# Patient Record
Sex: FEMALE | Race: WHITE | NOT HISPANIC OR LATINO | Employment: FULL TIME | URBAN - METROPOLITAN AREA
[De-identification: names, ages, dates, MRNs, and addresses within clinical notes are randomized per-mention and may not be internally consistent; named-entity substitution may affect disease eponyms.]

---

## 2023-02-25 LAB
ALANINE AMINOTRANSFERASE (SGPT) (U/L) IN SER/PLAS: 7 U/L (ref 7–45)
ALBUMIN (G/DL) IN SER/PLAS: 4.2 G/DL (ref 3.4–5)
ALKALINE PHOSPHATASE (U/L) IN SER/PLAS: 41 U/L (ref 33–110)
ANION GAP IN SER/PLAS: 13 MMOL/L (ref 10–20)
ASPARTATE AMINOTRANSFERASE (SGOT) (U/L) IN SER/PLAS: 13 U/L (ref 9–39)
BASOPHILS (10*3/UL) IN BLOOD BY AUTOMATED COUNT: 0.06 X10E9/L (ref 0–0.1)
BASOPHILS/100 LEUKOCYTES IN BLOOD BY AUTOMATED COUNT: 1.1 % (ref 0–2)
BILIRUBIN TOTAL (MG/DL) IN SER/PLAS: 0.6 MG/DL (ref 0–1.2)
CALCIUM (MG/DL) IN SER/PLAS: 9.3 MG/DL (ref 8.6–10.3)
CARBON DIOXIDE, TOTAL (MMOL/L) IN SER/PLAS: 28 MMOL/L (ref 21–32)
CHLORIDE (MMOL/L) IN SER/PLAS: 103 MMOL/L (ref 98–107)
CHOLESTEROL (MG/DL) IN SER/PLAS: 179 MG/DL (ref 0–199)
CHOLESTEROL IN HDL (MG/DL) IN SER/PLAS: 63.9 MG/DL
CHOLESTEROL/HDL RATIO: 2.8
CREATININE (MG/DL) IN SER/PLAS: 0.77 MG/DL (ref 0.5–1.05)
EOSINOPHILS (10*3/UL) IN BLOOD BY AUTOMATED COUNT: 0.19 X10E9/L (ref 0–0.7)
EOSINOPHILS/100 LEUKOCYTES IN BLOOD BY AUTOMATED COUNT: 3.6 % (ref 0–6)
ERYTHROCYTE DISTRIBUTION WIDTH (RATIO) BY AUTOMATED COUNT: 12.5 % (ref 11.5–14.5)
ERYTHROCYTE MEAN CORPUSCULAR HEMOGLOBIN CONCENTRATION (G/DL) BY AUTOMATED: 32.1 G/DL (ref 32–36)
ERYTHROCYTE MEAN CORPUSCULAR VOLUME (FL) BY AUTOMATED COUNT: 101 FL (ref 80–100)
ERYTHROCYTES (10*6/UL) IN BLOOD BY AUTOMATED COUNT: 3.97 X10E12/L (ref 4–5.2)
GFR FEMALE: >90 ML/MIN/1.73M2
GLUCOSE (MG/DL) IN SER/PLAS: 86 MG/DL (ref 74–99)
HEMATOCRIT (%) IN BLOOD BY AUTOMATED COUNT: 40.2 % (ref 36–46)
HEMOGLOBIN (G/DL) IN BLOOD: 12.9 G/DL (ref 12–16)
IMMATURE GRANULOCYTES/100 LEUKOCYTES IN BLOOD BY AUTOMATED COUNT: 0.2 % (ref 0–0.9)
LDL: 104 MG/DL (ref 0–99)
LEUKOCYTES (10*3/UL) IN BLOOD BY AUTOMATED COUNT: 5.3 X10E9/L (ref 4.4–11.3)
LYMPHOCYTES (10*3/UL) IN BLOOD BY AUTOMATED COUNT: 1.33 X10E9/L (ref 1.2–4.8)
LYMPHOCYTES/100 LEUKOCYTES IN BLOOD BY AUTOMATED COUNT: 25.3 % (ref 13–44)
MONOCYTES (10*3/UL) IN BLOOD BY AUTOMATED COUNT: 0.44 X10E9/L (ref 0.1–1)
MONOCYTES/100 LEUKOCYTES IN BLOOD BY AUTOMATED COUNT: 8.4 % (ref 2–10)
NEUTROPHILS (10*3/UL) IN BLOOD BY AUTOMATED COUNT: 3.22 X10E9/L (ref 1.2–7.7)
NEUTROPHILS/100 LEUKOCYTES IN BLOOD BY AUTOMATED COUNT: 61.4 % (ref 40–80)
PLATELETS (10*3/UL) IN BLOOD AUTOMATED COUNT: 255 X10E9/L (ref 150–450)
POTASSIUM (MMOL/L) IN SER/PLAS: 4.3 MMOL/L (ref 3.5–5.3)
PROTEIN TOTAL: 6.9 G/DL (ref 6.4–8.2)
SODIUM (MMOL/L) IN SER/PLAS: 140 MMOL/L (ref 136–145)
TRIGLYCERIDE (MG/DL) IN SER/PLAS: 57 MG/DL (ref 0–149)
UREA NITROGEN (MG/DL) IN SER/PLAS: 15 MG/DL (ref 6–23)
VLDL: 11 MG/DL (ref 0–40)

## 2024-02-26 ENCOUNTER — OFFICE VISIT (OUTPATIENT)
Dept: PRIMARY CARE | Facility: CLINIC | Age: 44
End: 2024-02-26
Payer: COMMERCIAL

## 2024-02-26 ENCOUNTER — LAB (OUTPATIENT)
Dept: LAB | Facility: LAB | Age: 44
End: 2024-02-26
Payer: COMMERCIAL

## 2024-02-26 VITALS
DIASTOLIC BLOOD PRESSURE: 68 MMHG | BODY MASS INDEX: 22.07 KG/M2 | HEART RATE: 73 BPM | SYSTOLIC BLOOD PRESSURE: 106 MMHG | TEMPERATURE: 98.2 F | OXYGEN SATURATION: 97 % | WEIGHT: 149 LBS | HEIGHT: 69 IN

## 2024-02-26 DIAGNOSIS — Z00.00 HEALTHCARE MAINTENANCE: ICD-10-CM

## 2024-02-26 DIAGNOSIS — Z23 NEED FOR VACCINATION: ICD-10-CM

## 2024-02-26 DIAGNOSIS — Z00.00 HEALTHCARE MAINTENANCE: Primary | ICD-10-CM

## 2024-02-26 DIAGNOSIS — F41.9 ANXIETY: ICD-10-CM

## 2024-02-26 DIAGNOSIS — Z12.11 ENCOUNTER FOR SCREENING FOR MALIGNANT NEOPLASM OF COLON: ICD-10-CM

## 2024-02-26 PROBLEM — F32.A ANXIETY AND DEPRESSION: Status: ACTIVE | Noted: 2024-02-26

## 2024-02-26 PROBLEM — R10.9 STOMACH PAIN: Status: RESOLVED | Noted: 2024-02-26 | Resolved: 2024-02-26

## 2024-02-26 PROBLEM — R00.2 PALPITATIONS: Status: ACTIVE | Noted: 2024-02-26

## 2024-02-26 PROBLEM — S93.409A ANKLE SPRAIN: Status: RESOLVED | Noted: 2024-02-26 | Resolved: 2024-02-26

## 2024-02-26 LAB
ALBUMIN SERPL BCP-MCNC: 4.2 G/DL (ref 3.4–5)
ALP SERPL-CCNC: 36 U/L (ref 33–110)
ALT SERPL W P-5'-P-CCNC: 8 U/L (ref 7–45)
ANION GAP SERPL CALC-SCNC: 8 MMOL/L (ref 10–20)
AST SERPL W P-5'-P-CCNC: 14 U/L (ref 9–39)
BASOPHILS # BLD AUTO: 0.04 X10*3/UL (ref 0–0.1)
BASOPHILS NFR BLD AUTO: 0.7 %
BILIRUB SERPL-MCNC: 0.6 MG/DL (ref 0–1.2)
BUN SERPL-MCNC: 11 MG/DL (ref 6–23)
CALCIUM SERPL-MCNC: 8.9 MG/DL (ref 8.6–10.3)
CHLORIDE SERPL-SCNC: 105 MMOL/L (ref 98–107)
CHOLEST SERPL-MCNC: 168 MG/DL (ref 0–199)
CHOLESTEROL/HDL RATIO: 2.7
CO2 SERPL-SCNC: 30 MMOL/L (ref 21–32)
CREAT SERPL-MCNC: 0.71 MG/DL (ref 0.5–1.05)
EGFRCR SERPLBLD CKD-EPI 2021: >90 ML/MIN/1.73M*2
EOSINOPHIL # BLD AUTO: 0.16 X10*3/UL (ref 0–0.7)
EOSINOPHIL NFR BLD AUTO: 2.7 %
ERYTHROCYTE [DISTWIDTH] IN BLOOD BY AUTOMATED COUNT: 12.3 % (ref 11.5–14.5)
GLUCOSE SERPL-MCNC: 77 MG/DL (ref 74–99)
HCT VFR BLD AUTO: 38.4 % (ref 36–46)
HDLC SERPL-MCNC: 62.8 MG/DL
HGB BLD-MCNC: 12.4 G/DL (ref 12–16)
IMM GRANULOCYTES # BLD AUTO: 0.02 X10*3/UL (ref 0–0.7)
IMM GRANULOCYTES NFR BLD AUTO: 0.3 % (ref 0–0.9)
LDLC SERPL CALC-MCNC: 91 MG/DL
LYMPHOCYTES # BLD AUTO: 1.55 X10*3/UL (ref 1.2–4.8)
LYMPHOCYTES NFR BLD AUTO: 26.5 %
MCH RBC QN AUTO: 32.9 PG (ref 26–34)
MCHC RBC AUTO-ENTMCNC: 32.3 G/DL (ref 32–36)
MCV RBC AUTO: 102 FL (ref 80–100)
MONOCYTES # BLD AUTO: 0.47 X10*3/UL (ref 0.1–1)
MONOCYTES NFR BLD AUTO: 8 %
NEUTROPHILS # BLD AUTO: 3.6 X10*3/UL (ref 1.2–7.7)
NEUTROPHILS NFR BLD AUTO: 61.8 %
NON HDL CHOLESTEROL: 105 MG/DL (ref 0–149)
NRBC BLD-RTO: 0 /100 WBCS (ref 0–0)
PLATELET # BLD AUTO: 232 X10*3/UL (ref 150–450)
POTASSIUM SERPL-SCNC: 4.1 MMOL/L (ref 3.5–5.3)
PROT SERPL-MCNC: 6.4 G/DL (ref 6.4–8.2)
RBC # BLD AUTO: 3.77 X10*6/UL (ref 4–5.2)
SODIUM SERPL-SCNC: 139 MMOL/L (ref 136–145)
TRIGL SERPL-MCNC: 73 MG/DL (ref 0–149)
TSH SERPL-ACNC: 1.8 MIU/L (ref 0.44–3.98)
VLDL: 15 MG/DL (ref 0–40)
WBC # BLD AUTO: 5.8 X10*3/UL (ref 4.4–11.3)

## 2024-02-26 PROCEDURE — 99396 PREV VISIT EST AGE 40-64: CPT | Performed by: NURSE PRACTITIONER

## 2024-02-26 PROCEDURE — 90715 TDAP VACCINE 7 YRS/> IM: CPT | Performed by: NURSE PRACTITIONER

## 2024-02-26 PROCEDURE — 1036F TOBACCO NON-USER: CPT | Performed by: NURSE PRACTITIONER

## 2024-02-26 PROCEDURE — 84443 ASSAY THYROID STIM HORMONE: CPT

## 2024-02-26 PROCEDURE — 85025 COMPLETE CBC W/AUTO DIFF WBC: CPT

## 2024-02-26 PROCEDURE — 80061 LIPID PANEL: CPT

## 2024-02-26 PROCEDURE — 80053 COMPREHEN METABOLIC PANEL: CPT

## 2024-02-26 PROCEDURE — 90471 IMMUNIZATION ADMIN: CPT | Performed by: NURSE PRACTITIONER

## 2024-02-26 PROCEDURE — 36415 COLL VENOUS BLD VENIPUNCTURE: CPT

## 2024-02-26 RX ORDER — VIT C/E/ZN/COPPR/LUTEIN/ZEAXAN 250MG-90MG
CAPSULE ORAL
COMMUNITY
Start: 2015-11-25

## 2024-02-26 RX ORDER — MULTIVITAMIN
1 TABLET ORAL DAILY
COMMUNITY
Start: 2017-02-16

## 2024-02-26 RX ORDER — PROPRANOLOL/HYDROCHLOROTHIAZID 40 MG-25MG
1 TABLET ORAL DAILY
COMMUNITY

## 2024-02-26 RX ORDER — ESCITALOPRAM OXALATE 20 MG/1
20 TABLET ORAL DAILY
COMMUNITY
End: 2024-02-26 | Stop reason: SDUPTHER

## 2024-02-26 RX ORDER — ESCITALOPRAM OXALATE 20 MG/1
20 TABLET ORAL DAILY
Qty: 90 TABLET | Refills: 3 | Status: SHIPPED | OUTPATIENT
Start: 2024-02-26

## 2024-02-26 ASSESSMENT — ENCOUNTER SYMPTOMS
GASTROINTESTINAL NEGATIVE: 1
MUSCULOSKELETAL NEGATIVE: 1
CONSTITUTIONAL NEGATIVE: 1
PSYCHIATRIC NEGATIVE: 1
NEUROLOGICAL NEGATIVE: 1
RESPIRATORY NEGATIVE: 1
CARDIOVASCULAR NEGATIVE: 1

## 2024-02-26 ASSESSMENT — PATIENT HEALTH QUESTIONNAIRE - PHQ9
1. LITTLE INTEREST OR PLEASURE IN DOING THINGS: NOT AT ALL
2. FEELING DOWN, DEPRESSED OR HOPELESS: NOT AT ALL
SUM OF ALL RESPONSES TO PHQ9 QUESTIONS 1 AND 2: 0

## 2024-02-26 NOTE — PATIENT INSTRUCTIONS
I will follow up with the blood work.   Please have colonoscopy done. Tdap updated today.  Medications renewed.   Follow up in 1 year and as needed

## 2024-02-26 NOTE — PROGRESS NOTES
"Subjective   Patient ID: Paty Tom is a 43 y.o. female who presents for Annual Exam (Patient has GYN-Reema Osoriol).    HPI Presents today for health maintenance.  Overall is excellent.   Dentist every 6 months for cleaning.  Brush and floss  Eye exam yearly.   Wears glasses  Skin skins yearly.   No hearing loss..   Non smoker, alcohol 0-1 per week. No drug use.   Diet is well balanced.   Exercise,  El[place]pit[place] 4 times a week along with weight training 4 days a week.   30-40 minutes each time  Immunizations as noted. . Last tdap 9 years ago.   HPV and pap negative 2/22/2023  Mammogram 9/2023 normal.   No colon screening yet  Tolerating medications well, anxiety is under good control    Review of Systems   Constitutional: Negative.    Respiratory: Negative.     Cardiovascular: Negative.    Gastrointestinal: Negative.    Musculoskeletal: Negative.         Has had some elbow pain.  No redness or swelling.  Has been seeing a chiropractor   Skin: Negative.    Neurological: Negative.    Psychiatric/Behavioral: Negative.         Objective   /68 (BP Location: Left arm, Patient Position: Sitting)   Pulse 73   Temp 36.8 °C (98.2 °F) (Temporal)   Ht 1.74 m (5' 8.5\")   Wt 67.6 kg (149 lb)   SpO2 97%   BMI 22.33 kg/m²     Physical Exam  Constitutional:       Appearance: Normal appearance.   HENT:      Right Ear: Tympanic membrane, ear canal and external ear normal.      Left Ear: Tympanic membrane, ear canal and external ear normal.      Nose: Nose normal.      Mouth/Throat:      Mouth: Mucous membranes are moist.      Pharynx: Oropharynx is clear.   Eyes:      Pupils: Pupils are equal, round, and reactive to light.   Cardiovascular:      Rate and Rhythm: Normal rate and regular rhythm.      Pulses: Normal pulses.      Heart sounds: Normal heart sounds.   Pulmonary:      Effort: Pulmonary effort is normal.      Breath sounds: Normal breath sounds.   Abdominal:      General: Abdomen is flat. Bowel sounds are " normal.      Palpations: Abdomen is soft.   Musculoskeletal:         General: Normal range of motion.      Cervical back: Normal range of motion.   Lymphadenopathy:      Cervical: No cervical adenopathy.   Skin:     General: Skin is warm.   Neurological:      General: No focal deficit present.      Mental Status: She is alert and oriented to person, place, and time.   Psychiatric:         Mood and Affect: Mood normal.         Behavior: Behavior normal.         Assessment/Plan   Problem List Items Addressed This Visit    None  Visit Diagnoses         Codes    Healthcare maintenance    -  Primary Z00.00    Relevant Orders    CBC and Auto Differential    Comprehensive Metabolic Panel    Lipid Panel    Thyroid Stimulating Hormone    Anxiety     F41.9    Relevant Medications    escitalopram (Lexapro) 20 mg tablet    Encounter for screening for malignant neoplasm of colon     Z12.11    Relevant Orders    Colonoscopy Screening; Average Risk Patient    Need for vaccination     Z23    Relevant Orders    Tdap vaccine, age 7 years and older  (BOOSTRIX)

## 2024-08-07 PROBLEM — J02.0 PHARYNGITIS DUE TO STREPTOCOCCUS SPECIES: Status: RESOLVED | Noted: 2024-08-07 | Resolved: 2024-08-07

## 2024-08-08 ENCOUNTER — APPOINTMENT (OUTPATIENT)
Dept: PRIMARY CARE | Facility: CLINIC | Age: 44
End: 2024-08-08
Payer: COMMERCIAL

## 2024-08-08 ENCOUNTER — HOSPITAL ENCOUNTER (OUTPATIENT)
Dept: RADIOLOGY | Facility: CLINIC | Age: 44
Discharge: HOME | End: 2024-08-08
Payer: COMMERCIAL

## 2024-08-08 VITALS
BODY MASS INDEX: 22.18 KG/M2 | HEART RATE: 69 BPM | SYSTOLIC BLOOD PRESSURE: 112 MMHG | TEMPERATURE: 97.6 F | OXYGEN SATURATION: 97 % | DIASTOLIC BLOOD PRESSURE: 72 MMHG | WEIGHT: 148 LBS

## 2024-08-08 DIAGNOSIS — M25.552 LEFT HIP PAIN: ICD-10-CM

## 2024-08-08 DIAGNOSIS — M25.552 LEFT HIP PAIN: Primary | ICD-10-CM

## 2024-08-08 PROCEDURE — 73502 X-RAY EXAM HIP UNI 2-3 VIEWS: CPT | Mod: LT

## 2024-08-08 PROCEDURE — 99213 OFFICE O/P EST LOW 20 MIN: CPT | Performed by: NURSE PRACTITIONER

## 2024-08-08 PROCEDURE — 1036F TOBACCO NON-USER: CPT | Performed by: NURSE PRACTITIONER

## 2024-08-08 ASSESSMENT — ENCOUNTER SYMPTOMS: CONSTITUTIONAL NEGATIVE: 1

## 2024-08-08 NOTE — PATIENT INSTRUCTIONS
I will follow up with the xray.   Please do  the PT and if no better in 3-4 let me know and I will send you for evaluation with an orthopedist.

## 2024-08-08 NOTE — PROGRESS NOTES
Subjective   Patient ID: Paty Tom is a 43 y.o. female who presents for Hip Pain (Left sided-now has pain while walking).    HPI Presents today with Left hip pain.  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   Now painful with walking  Feels like it shifts.  It has not limited her motion.    Review of Systems   Constitutional: Negative.    Musculoskeletal:         As noted in HPI         Objective   /72 (BP Location: Right arm, Patient Position: Sitting)   Pulse 69   Temp 36.4 °C (97.6 °F) (Temporal)   Wt 67.1 kg (148 lb)   SpO2 97%   BMI 22.18 kg/m²     Physical Exam  Constitutional:       Appearance: Normal appearance.   Pulmonary:      Effort: Pulmonary effort is normal.   Musculoskeletal:         General: Normal range of motion.      Comments: Pain with straight leg lift in her left groin and also with flexed knee and rotation up and inward   Skin:     General: Skin is warm.   Neurological:      General: No focal deficit present.      Mental Status: She is alert.   Psychiatric:         Mood and Affect: Mood normal.         Behavior: Behavior normal.         Assessment/Plan   Problem List Items Addressed This Visit    None  Visit Diagnoses         Codes    Left hip pain    -  Primary M25.552    Relevant Orders    XR hip left with pelvis when performed 2 or 3 views    Referral to Physical Therapy

## 2024-08-28 ENCOUNTER — APPOINTMENT (OUTPATIENT)
Dept: GASTROENTEROLOGY | Facility: EXTERNAL LOCATION | Age: 44
End: 2024-08-28
Payer: COMMERCIAL

## 2024-08-28 DIAGNOSIS — Z12.11 ENCOUNTER FOR SCREENING FOR MALIGNANT NEOPLASM OF COLON: ICD-10-CM

## 2024-08-28 PROCEDURE — 45378 DIAGNOSTIC COLONOSCOPY: CPT | Performed by: INTERNAL MEDICINE

## 2024-09-03 ENCOUNTER — EVALUATION (OUTPATIENT)
Dept: PHYSICAL THERAPY | Facility: CLINIC | Age: 44
End: 2024-09-03
Payer: COMMERCIAL

## 2024-09-03 DIAGNOSIS — M25.552 LEFT HIP PAIN: ICD-10-CM

## 2024-09-03 PROCEDURE — 97110 THERAPEUTIC EXERCISES: CPT | Mod: GP | Performed by: PHYSICAL THERAPIST

## 2024-09-03 PROCEDURE — 97161 PT EVAL LOW COMPLEX 20 MIN: CPT | Mod: GP | Performed by: PHYSICAL THERAPIST

## 2024-09-03 ASSESSMENT — PATIENT HEALTH QUESTIONNAIRE - PHQ9
2. FEELING DOWN, DEPRESSED OR HOPELESS: NOT AT ALL
SUM OF ALL RESPONSES TO PHQ9 QUESTIONS 1 AND 2: 0
1. LITTLE INTEREST OR PLEASURE IN DOING THINGS: NOT AT ALL

## 2024-09-03 ASSESSMENT — ENCOUNTER SYMPTOMS
OCCASIONAL FEELINGS OF UNSTEADINESS: 0
DEPRESSION: 0
LOSS OF SENSATION IN FEET: 0

## 2024-09-03 NOTE — PROGRESS NOTES
Physical Therapy Evaluation    Patient Name: Paty Tom  MRN: [id number]  Today's Date: 9/3/2024  Visit: 1/mn  DOS/DOI:  1/1/2024 (date symptoms worsened)  Referred by:   Tiffani Smith    Time Calculation  Start Time: 1402  Stop Time: 1450  Time Calculation (min): 48 min  PT Evaluation Time Entry  PT Evaluation (Low) Time Entry: 30  PT Therapeutic Procedures Time Entry  Therapeutic Exercise Time Entry: 18                 Diagnosis:   1. 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  The L hip has always shifted and clicked with intermittent pain for 20 years.  When she stopped dancing the pain went away with ADLS but would still hurt with stretching..  The pain has been worsening since January 2024 without known cause or injury.  She went to her MD and a x-ray was done which was positive for mild OA.  She was referred to PT.  She may see ortho.    Precautions  None    SUBJECTIVE  Symptoms:  - She reports anterior L hip pain/groin that is described as deep and achy.  It can become sharp if the joint shifts.  The pain is rated 2-5/10.  The pain is worsened by ER of the hip, sitting, hip add and moving through extreme ROM.  Nothing helps decrease the pain at this point.  Functionally she is limited in hiking, jumping or running.      Patient's Living Environment:  - 2 story home with bedroom on 2nd floor  - lives with  and 4 children  - works as     Previous Level of Function:  - pain with running, hiking and jumping before January 2024 but not as bad or as limiting.     Patient's Therapy Goals:  - resolve pain  - improve joint stability    OBJECTIVE  Observation:    - good LE alignment    Palpation:   - no pain with palpation (it is  deeper into the joint)  - ASIS level in supine    AROM:  - L hip flex = 130 with ant L hip pain, abd = 30 with ant L hip pain, add = WFL, ER/IR = 40    MMT:  - L hip flex, ext, IR = 5/5, ER = 4+/5, add = 5/5, abd = 4+/5  - L glut min = 5/5, med = 4+/5, max = 4+/5  - TA = strong    Special Tests:  - Positive squat (weight shifts to R LE due to L hip pain and shifting), scour, FADIR, ANITA  - Negative PA L1-5    Joint Mobility:   - generally hypermobile due to dance    Outcome Measure:  - LEFS = 70    ASSESSMENT  The patient is a 45 y/o female presenting with chronic, deep, anterior L hip pain accompanied by shifting and clicking that worsened in January 2024 without known cause.  I suspect she has a labral tear.  Symptoms are worse with any ER position.  Positive scour, FABDIR, ANITA and squat test.  She has decreased AROM into ER./IR and is functionally limited in more athletic movements such as running, jumping or hiking.  She will benefit from PT to stabilize the L hip.  We will continue for approximately 6 weeks.  If no change seen she will be referred to ortho and a MRI requested.    Is skilled care required:  yes  Rehab potential:  good  Clinical presentation:  Stable and/or uncomplicated characteristics       Complexity:   . Low complexity due to patient's clinical presentation being stable and uncomplicated by any significant comorbidities that may affect rehab tolerance and progression.       Personal factors effecting care:  none    PLAN  Frequency/duration:  2x/wk x 6 wks  Planned Interventions:  MT, therapeutic exercise, neuro re-ed, modalities prn  Patient/caregiver agrees with POC:  yes    TODAY'S TREATMENT  - evaluation of the L hip completed.  She was educated on her dx and the anatomy of the hip.  She was given the names of Dr. Walker and Dr. Gastelum as potential ortho Mds for her to see.  - she was given a HEP as seen below:    Access Code: [email or web address]  URL:  [email or web address]  Date: 09/03/2024  Prepared by: Malorie Arevalo    Exercises  - Standing Repeated Hip Abduction with Resistance  - 1 x daily - 7 x weekly - 2-3 sets - 10 reps  - Standing Repeated Hip Extension with Resistance  - 1 x daily - 7 x weekly - 2-3 sets - 10 reps  - Standing Repeated Hip Flexion with Resistance  - 1 x daily - 7 x weekly - 2-3 sets - 10 reps  - Standing Repeated Hip Adduction with Resistance  - 1 x daily - 7 x weekly - 2-3 sets - 10 reps  - Forward T  - 1 x daily - 7 x weekly - 2-3 sets - 10 reps    Goals:   Active       PT Problem       she will be independent with he HEP       Start:  09/03/24    Expected End:  09/10/24            increase AROM ER/IR of L hip for 5-10 degrees for positions requiring rot of the hip such as crossing her ankle over there R leg to put shoes on.       Start:  09/03/24    Expected End:  09/17/24            increase stability of the L hip by achieving 5/5 strength of the glut med and max       Start:  09/03/24    Expected End:  10/15/24            return to activities such as running and hiking without symptoms increasing.       Start:  09/03/24    Expected End:  10/15/24            demonstrate labral healing by having a negative FADIR, ANITA and scour test       Start:  09/03/24    Expected End:  10/15/24

## 2024-09-09 ENCOUNTER — TREATMENT (OUTPATIENT)
Dept: PHYSICAL THERAPY | Facility: CLINIC | Age: 44
End: 2024-09-09
Payer: COMMERCIAL

## 2024-09-09 DIAGNOSIS — M25.552 LEFT HIP PAIN: ICD-10-CM

## 2024-09-09 PROCEDURE — 97530 THERAPEUTIC ACTIVITIES: CPT | Mod: GP

## 2024-09-09 PROCEDURE — 97110 THERAPEUTIC EXERCISES: CPT | Mod: GP

## 2024-09-09 NOTE — PROGRESS NOTES
Physical Therapy Treatment Note     Patient Name: Paty Tom  MRN: [id number]  Today's Date: 9/9/2024     Time Calculation  Start Time: 0915  Stop Time: 1000  Time Calculation (min): 45 min  PT Therapeutic Procedures Time Entry  Therapeutic Exercise Time Entry: 30  Therapeutic Activity Time Entry: 15    Visit: 2/MN  Referred by:  DANNI Mojica-CNP     Insurance:  [employer]   Certification Dates:    Current Problem:   1. 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  The Lt hip has always shifted & clicked with intermittent pain for 20 years.  When she stopped dancing the pain went away with ADL's but would still hurt with stretching..  The pain has been worsening since January 2024 without known cause or injury.  She saw her PCP, + x-ray for mild OA.  She was referred to PT.  She may see ortho.    Subjective:  Pt stated she only had pain with standing hip flexion with her HEP.  She is not limited with activities, but there's some discomfort.      Patient's Living Environment/PLOF: 2 story home with bedroom on 2nd floor,  with 4 children,   works as , choreographs dance in [place]  Patient's Therapy Goals: Decrease Lt hip pain, be more active, resolve joint instability     Pain:  Intensity: 2-5/10, deep, achy, can be sharp            Location: Lt groin            Duration: intermittent            Aggravating Factor: prolonged WB, Hip ER, Flex, sitting, impact activities            Alleviating Factor:  unloading Hip    Social Factors:  1-2 personal factors &/or comorbidities       Precautions:  Rt ankle sprain 3-4 x over the last few years     Objective      HEP Compliance:  100%    Treatments:    Exercises:  - Standing Repeated Hip Abd,  purple, 2 x 10, on black disc  - Standing Repeated Hip Ext,  purple, 2 x 10, on black disc  -  Standing Repeated Hip Flex,  purple, 2 x 10, on black disc  - Standing Repeated Hip Add,  purple, 2 x 10, on black disc  - Forward T, 10 x each, (B)    Bridges, 2 x 15, purple, 2 x 10    Plyothrow, on BOSU, 10 x each forward    Clamshells, purple, 2 x 10 (B)    Bird Dogs, 15 x each, 2.5 lbs    Fire Hydrants, 2 x 10       Assessment:     Pt has pain with any activity in flexed Lt hip.  Forward T's tolerated w/o increased pain.  Added exercise as noted above for LE strengthening.  Discussed pathology & limiting aggravating factors.  She verbalized an understanding.  She did not have pain with clams under resistance, but she did have some clicking in the Lt hip with fire hydrants w/o resistance.      Plan: Continue per POC & pt tolerance.  Frequency/duration: 2x/wk for 6 wks

## 2024-09-10 ENCOUNTER — APPOINTMENT (OUTPATIENT)
Dept: PHYSICAL THERAPY | Facility: CLINIC | Age: 44
End: 2024-09-10
Payer: COMMERCIAL

## 2024-09-11 ENCOUNTER — TREATMENT (OUTPATIENT)
Dept: PHYSICAL THERAPY | Facility: CLINIC | Age: 44
End: 2024-09-11
Payer: COMMERCIAL

## 2024-09-11 DIAGNOSIS — M25.552 LEFT HIP PAIN: ICD-10-CM

## 2024-09-11 PROCEDURE — 97110 THERAPEUTIC EXERCISES: CPT | Mod: GP

## 2024-09-11 PROCEDURE — 97530 THERAPEUTIC ACTIVITIES: CPT | Mod: GP

## 2024-09-11 NOTE — PROGRESS NOTES
Physical Therapy Treatment Note     Patient Name: Paty Tom  MRN: [id number]  Today's Date: 9/11/2024     Time Calculation  Start Time: 1045  Stop Time: 1135  Time Calculation (min): 50 min  PT Therapeutic Procedures Time Entry  Therapeutic Exercise Time Entry: 20  Therapeutic Activity Time Entry: 35    Visit: 3/MN  Referred by:  DANNI Mojica-CNP     Insurance:  [employer], 80% coverage, visits based on MN, no auth, ded $5000.00 (not met) per Laura at [facility] call ref# [id number]  Certification Dates:    Current Problem:   1. 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  The Lt hip has always shifted & clicked with intermittent pain for 20 years.  When she stopped dancing the pain went away with ADL's but would still hurt with stretching..  The pain has been worsening since January 2024 without known cause or injury.  She saw her PCP, + x-ray for mild OA.  She was referred to PT.  She may see ortho.    Subjective:  Pt stated no issues from the last visit.  The left hip feels good today.  She has not had time to schedule ortho consult yet.        Patient's Living Environment/PLOF: 2 story home with bedroom on 2nd floor,  with 4 children,   works as , choreographs dance in [place]  Patient's Therapy Goals: Decrease Lt hip pain, be more active, resolve joint instability     Pain:  Intensity: 2/10, deep, achy, can be sharp            Location: Lt groin            Duration: intermittent            Aggravating Factor: prolonged WB, Hip ER, Flex, sitting, impact activities            Alleviating Factor:  unloading Hip    Social Factors:  1-2 personal factors &/or comorbidities     Precautions:  Rt ankle sprain 3-4 x over the last few years     Objective      HEP Compliance:  100%    Treatments:    Exercises:  - Standing Repeated Hip Abd,   purple, 2 x 10, on black disc  - Standing Repeated Hip Ext,  purple, 2 x 10, on black disc  - Standing Repeated Hip Flex,  purple, 2 x 10, on black disc  - Standing Repeated Hip Add,  purple, 2 x 10, on black disc  - Bridges, 2 x 15, purple, 2 x 10    Therapeutic Activity    Hip Ext, 2 x 10, 40 lbs    Hip Add, 2 x 10, 40 lbs    Cable Walks, 55 lbs, 5 x, 45 lbs, 5 x each    Planks, increased pain Rt Hip    Bird Dogs, 15 x each, 2.5 lbs LE's. 1 lb, UE's    Fire Hydrants, 2 x 10     Forward T, 2 x 10 x each, (B), 5 lbs    Plyothrow, on BOSU, 10 x each forward    Clamshells, purple, 2 x 10 (B)    Assessment:     Pt has pain with any activity in hip flexion, ER, add.  Added exercise as noted above for LE strengthening & stability.  +Clicking in the Lt hip with fire hydrants w/o resistance today, but non painful.  Good tolerance to all other activities.    Plan: Continue per POC & pt tolerance.  Frequency/duration: 2x/wk for 6 wks

## 2024-09-16 DIAGNOSIS — M25.552 LEFT HIP PAIN: Primary | ICD-10-CM

## 2024-09-17 ENCOUNTER — TREATMENT (OUTPATIENT)
Dept: PHYSICAL THERAPY | Facility: CLINIC | Age: 44
End: 2024-09-17
Payer: COMMERCIAL

## 2024-09-17 DIAGNOSIS — M25.552 LEFT HIP PAIN: Primary | ICD-10-CM

## 2024-09-17 PROCEDURE — 97110 THERAPEUTIC EXERCISES: CPT | Mod: GP

## 2024-09-17 PROCEDURE — 97124 MASSAGE THERAPY: CPT | Mod: GP

## 2024-09-17 PROCEDURE — 97140 MANUAL THERAPY 1/> REGIONS: CPT | Mod: GP

## 2024-09-17 NOTE — PROGRESS NOTES
Physical Therapy Treatment Note     Patient Name: Paty Tom  MRN: [id number]  Today's Date: 9/17/2024     Time Calculation  Start Time: 1130  Stop Time: 1230  Time Calculation (min): 60 min  PT Therapeutic Procedures Time Entry  Manual Therapy Time Entry: 10  Therapeutic Exercise Time Entry: 14  Therapeutic Massage Time Entry: 36    Visit: 4/MN  Referred by:  Tiffani Smith, APRN-CNP     Insurance:  [employer], 80% coverage, visits based on MN, no auth, ded $5000.00 (not met) per Laura at [facility] call ref# [id number]    Current Problem:   1. 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  The Lt hip has always shifted & clicked with intermittent pain for 20 years.  When she stopped dancing the pain went away with ADL's but would still hurt with stretching..  The pain has been worsening since January 2024 without known cause or injury.  She saw her PCP, + x-ray for mild OA.  She was referred to PT.  She may see ortho.    Subjective:  Pt stated no issues from the last visit.  The left hip feels good today.  She has received 2 people for referrals for her Hip.  She will schedule later today.          Patient's Living Environment/PLOF: 2 story home with bedroom on 2nd floor,  with 4 children,   works as , choreographs dance in [place]    Patient's Therapy Goals: Decrease Lt hip pain, be more active, resolve joint instability     Pain:  Intensity: 2/10, deep, achy, can be sharp            Location: Lt groin            Duration: intermittent            Aggravating Factor: prolonged WB, Hip ER, Flex, sitting, impact activities            Alleviating Factor:  unloading Hip    Social Factors:  1-2 personal factors &/or comorbidities     Precautions:  Rt ankle sprain 3-4 x over the last few years     Objective      HEP Compliance:   100%    Treatments:    Exercises:  - Standing Repeated Hip Abd,  purple, 2 x 10, on black disc  - Standing Repeated Hip Ext,  purple, 2 x 10, on black disc  - Standing Repeated Hip Flex,  purple, 2 x 10, on black disc  - Standing Repeated Hip Add,  purple, 2 x 10, on black disc  - Bridges, 2 x 15, purple, 2 x 10    Therapeutic Activity    Hip Ext, 2 x 10, 40 lbs    Hip Add, 2 x 10, 40 lbs    Hip Abd, 2 x 10, 30 lbs    Cable Walks, 65 lbs, 5 x, 55 lbs, 5 x each    Planks, increased pain Rt Hip    Bird Dogs, 15 x each, 2.5 lbs LE's. 1 lb, UE's    Fire Hydrants, 2 x 10     Forward T, 2 x 10 x each, (B), 5 lbs    Plyothrow, on BOSU, 10 x each forward    Clamshells, purple, 2 x 10 (B)    P-Ball, SL Bridges, 2 x 10    Manual:  long axis distraction, lateral glides to Lt hip    Assessment:     Pt has pain with any activity into hip flexion, ER, add.  Added exercise as noted above for LE strengthening & stability.  + Clicking in the Lt hip with activities.  She was instructed to work in partial ranges, that donot provoke clicking or pain.  Good tolerance to all other activities.    Plan: Continue per POC & pt tolerance.  Frequency/duration: 2x/wk for 6 wks

## 2024-09-20 ENCOUNTER — TREATMENT (OUTPATIENT)
Dept: PHYSICAL THERAPY | Facility: CLINIC | Age: 44
End: 2024-09-20
Payer: COMMERCIAL

## 2024-09-20 DIAGNOSIS — M25.552 LEFT HIP PAIN: ICD-10-CM

## 2024-09-20 PROCEDURE — 97530 THERAPEUTIC ACTIVITIES: CPT | Mod: GP

## 2024-09-20 PROCEDURE — 97140 MANUAL THERAPY 1/> REGIONS: CPT | Mod: GP

## 2024-09-20 PROCEDURE — 97110 THERAPEUTIC EXERCISES: CPT | Mod: GP

## 2024-09-20 NOTE — PROGRESS NOTES
Physical Therapy Treatment Note     Patient Name: Paty Tom  MRN: [id number]  Today's Date: 9/20/2024     Time Calculation  Start Time: 0845  Stop Time: 0945  Time Calculation (min): 60 min  PT Therapeutic Procedures Time Entry  Manual Therapy Time Entry: 15  Therapeutic Exercise Time Entry: 15  Therapeutic Activity Time Entry: 30    Visit: Yaya/MN  Referred by:  Tiffani Smith, APRN-CNP     Insurance:  [employer], 80% coverage, visits based on MN, no auth, ded $5000.00 (not met) per Laura at [facility] call ref# [id number]    Current Problem:   1. 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  The Lt hip has always shifted & clicked with intermittent pain for 20 years.  When she stopped dancing the pain went away with ADL's but would still hurt with stretching..  The pain has been worsening since January 2024 without known cause or injury.  She saw her PCP, + x-ray for mild OA.  She was referred to PT.  She may see ortho.    Subjective:  Pt stated no issues from the last visit.  The left hip feels good today.  She has received 2 people for referrals for her Hip.  She will schedule later today.          Patient's Living Environment/PLOF: 2 story home with bedroom on 2nd floor,  with 4 children,   works as , choreographs dance in [place]    Patient's Therapy Goals: Decrease Lt hip pain, be more active, resolve joint instability     Pain:  Intensity: 2/10, deep, achy, can be sharp            Location: Lt groin            Duration: intermittent            Aggravating Factor: prolonged WB, Hip ER, Flex, sitting, impact activities            Alleviating Factor:  unloading Hip    Social Factors:  1-2 personal factors &/or comorbidities     Precautions:  Rt ankle sprain 3-4 x over the last few years     Objective      HEP Compliance:   100%    Treatments:    Exercises:  - Standing Repeated Hip Abd,  purple, 2 x 10, on black disc  - Standing Repeated Hip Ext,  purple, 2 x 10, on black disc  - Standing Repeated Hip Flex,  purple, 2 x 10, on black disc  - Standing Repeated Hip Add,  purple, 2 x 10, on black disc  - Bridges, 2 x 15, purple, 2 x 10    Therapeutic Activity    Hip Ext, 2 x 10, 40 lbs    Hip Add, 2 x 10, 40 lbs    Hip Abd, 2 x 10, 20 lbs    Cable Walks, 70 lbs, 5 x, 5 x each    Planks, increased pain Rt Hip    Bird Dogs, 15 x each, 2.5 lbs LE's. 1 lb, UE's    Fire Hydrants, 2 x 10     Forward T, 2 x 10 x each, (B), 5 lbs    Plyothrow, on BOSU, 10 x each forward    Clamshells, purple, 2 x 10 (B)    P-Ball, SL Bridges, 2 x 10    Manual:  long axis distraction, lateral glides to Lt hip    Assessment:     Pt has an ortho consult with Dr. Walker on 9-25-24, next Wed.  She denies any lingering pain with activities in the clinic.  + Clicking in the Lt hip with activities, decreased with position and load.  Less clicking noted today. Good tolerance to all other activities.    Plan: Continue per POC & pt tolerance.  Frequency/duration: 2x/wk for 6 wks

## 2024-09-23 ENCOUNTER — TREATMENT (OUTPATIENT)
Dept: PHYSICAL THERAPY | Facility: CLINIC | Age: 44
End: 2024-09-23
Payer: COMMERCIAL

## 2024-09-23 DIAGNOSIS — M25.552 LEFT HIP PAIN: Primary | ICD-10-CM

## 2024-09-23 PROCEDURE — 97140 MANUAL THERAPY 1/> REGIONS: CPT | Mod: GP

## 2024-09-23 PROCEDURE — 97110 THERAPEUTIC EXERCISES: CPT | Mod: GP

## 2024-09-23 PROCEDURE — 97530 THERAPEUTIC ACTIVITIES: CPT | Mod: GP

## 2024-09-23 NOTE — PROGRESS NOTES
Physical Therapy Treatment Note     Patient Name: Paty Tom  MRN: [id number]  Today's Date: 9/23/2024     Time Calculation  Start Time: 1000  Stop Time: 1100  Time Calculation (min): 60 min  PT Therapeutic Procedures Time Entry  Manual Therapy Time Entry: 10  Therapeutic Exercise Time Entry: 15  Therapeutic Activity Time Entry: 35    Visit: 6/MN  Referred by:  Tiffani Smith, APRN-CNP     Insurance:  [employer], 80% coverage, visits based on MN, no auth, ded $5000.00 (not met) per Laura at [facility] call ref# [id number]    Current Problem:   1. 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  The Lt hip has always shifted & clicked with intermittent pain for 20 years.  When she stopped dancing the pain went away with ADL's but would still hurt with stretching..  The pain has been worsening since January 2024 without known cause or injury.  She saw her PCP, + x-ray for mild OA.  She was referred to PT.  She may see ortho.    Subjective:  Pt stated no new issues today.  Seeing the ortho on Wed          Patient's Living Environment/PLOF: 2 story home with bedroom on 2nd floor,  with 4 children,   works as , choreographs dance in [employer]    Patient's Therapy Goals: Decrease Lt hip pain, be more active, resolve joint instability     Pain:  Intensity: 2/10, deep, achy, can be sharp            Location: Lt groin            Duration: intermittent            Aggravating Factor: prolonged WB, Hip ER, Flex, sitting, impact activities            Alleviating Factor:  unloading Hip    Social Factors:  1-2 personal factors &/or comorbidities     Precautions:  Rt ankle sprain 3-4 x over the last few years     Objective      HEP Compliance:  100%    Treatments:    Exercises:  - Standing Repeated Hip Abd,  purple, 2 x 10, on black disc  - Standing Repeated Hip Ext,   purple, 2 x 10, on black disc  - Standing Repeated Hip Flex,  purple, 2 x 10, on black disc  - Standing Repeated Hip Add,  purple, 2 x 10, on black disc  - Bridges, 2 x 15, purple, 2 x 10    Therapeutic Activity      Hip Ext, 2 x 10, 40 lbs    Hip Add, 2 x 10, 40 lbs    Hip Abd, 2 x 10, 20 lbs    Cable Walks, 70 lbs, 5 x, 5 x each    Planks, increased pain Rt Hip    Bird Dogs, 15 x each, 2.5 lbs LE's. 1 lb, UE's    Fire Hydrants, 2 x 10     Forward T, 2 x 10 x each, (B), 5 lbs    [place], on BOSU, 10 x each forward    Clamshells, purple, 2 x 10 (B)    P-Ball, SL Bridges, 2 x 10    Manual:  long axis distraction, lateral glides to Lt hip    Assessment:     Pt will meet with Dr. Walker on 9-25-24, this Wed.  She denies any lingering pain with activities but there is always some dull achiness in the Lt hip.  + Clicking in the Lt hip with activities, decreased with change in position and load.  Good tolerance to all other activities.    Plan: Continue per POC & pt tolerance.  Frequency/duration: 2x/wk for 6 wks

## 2024-09-25 ENCOUNTER — TREATMENT (OUTPATIENT)
Dept: PHYSICAL THERAPY | Facility: CLINIC | Age: 44
End: 2024-09-25
Payer: COMMERCIAL

## 2024-09-25 ENCOUNTER — HOSPITAL ENCOUNTER (OUTPATIENT)
Dept: RADIOLOGY | Facility: HOSPITAL | Age: 44
Discharge: HOME | End: 2024-09-25
Payer: COMMERCIAL

## 2024-09-25 ENCOUNTER — OFFICE VISIT (OUTPATIENT)
Dept: ORTHOPEDIC SURGERY | Facility: HOSPITAL | Age: 44
End: 2024-09-25
Payer: COMMERCIAL

## 2024-09-25 DIAGNOSIS — M25.552 LEFT HIP PAIN: ICD-10-CM

## 2024-09-25 PROCEDURE — 73502 X-RAY EXAM HIP UNI 2-3 VIEWS: CPT | Mod: LT

## 2024-09-25 PROCEDURE — 99214 OFFICE O/P EST MOD 30 MIN: CPT | Performed by: ORTHOPAEDIC SURGERY

## 2024-09-25 PROCEDURE — 99204 OFFICE O/P NEW MOD 45 MIN: CPT | Performed by: ORTHOPAEDIC SURGERY

## 2024-09-25 PROCEDURE — 73502 X-RAY EXAM HIP UNI 2-3 VIEWS: CPT | Mod: LEFT SIDE | Performed by: RADIOLOGY

## 2024-09-25 PROCEDURE — 97110 THERAPEUTIC EXERCISES: CPT | Mod: GP | Performed by: PHYSICAL THERAPIST

## 2024-09-25 NOTE — PROGRESS NOTES
"  Physical Therapy Treatment    Patient Name: Paty Tom  MRN: [id number]  Today's Date: 9/25/2024  Visit 7/mn  DOS/DOI:  1/1/2024 (date symptoms worsened)  Referred by:   Tiffani Smith  Time Calculation  Start Time: 0918  Stop Time: 1010  Time Calculation (min): 52 min     PT Therapeutic Procedures Time Entry  Therapeutic Exercise Time Entry: 52                 Diagnosis:   1. Left hip pain  Follow Up In Physical Therapy            PRECAUTIONS:  none    SUBJECTIVE:  She still has shifting and 3/10 pain at the L anterior hip and groin.  She feels stronger however.  Prolonged walking will increase pain.  She sees ortho this afternoon.  HEP compliance: yes    OBJECTIVE:  - AROM L hip flex = 130 with pain at end range, abd = 30,  - MMT R hip = 4+/5 all dir  - Positive scour, FADDIR, ANITA and piriformis    Treatment:  - continued with exercises to strengthen and stabilize the L hip  Therapeutic Exercise  Therapeutic Exercise Activity 1: NuStep L4, 5 min  Therapeutic Exercise Activity 2: SLS on bosu (flat side down), med ball toss, red ball, B, x30  Therapeutic Exercise Activity 3: hip add, ext, abd on multi hip machine, L4,B, 2x20  Therapeutic Exercise Activity 4: B SLS on disc with sliding  toe along \"Y\" x10  Therapeutic Exercise Activity 5: reverse plank, 5 sec hold x10  Therapeutic Exercise Activity 6: forward plank on hands, 10 sec x 10                ASSESSMENT:  Continue to suspect L hip labral tear.  She has pain deep in the groin that is increased by flexion activity.  All special tests continue to be positive.  She also has a hx of hypermobility from dancing.  She has progressed well in therapy in regard to strength but continues to have pain and shifting of the joint.      PLAN:  Continue as indicated after her ortho MD appt this afternoon.    "

## 2024-09-25 NOTE — PROGRESS NOTES
HPI  This is a pleasant 44 y.o. female here today for left hip pain. The patient has had L hip pain for a few years but it has gotten gradually worse over the last 6 months. She denies any trauma or increased/changes in activity. The pain is deep within the L hip. The pain is worse with walking, deep hip flexion, and rising from a deep squatting position. She has been in physical therapy for 3 weeks. She claims her strength is improving but her pain has not improved.  She has not had any injections. Of note, the patient used to be a professional dancer up until a few years ago.      Past Medical History:   Diagnosis Date    Personal history of other diseases of the nervous system and sense organs 11/25/2015    History of otitis media    Personal history of other diseases of the respiratory system 01/20/2016    History of acute pharyngitis    Pharyngitis due to Streptococcus species 08/07/2024       No past surgical history on file.    Social History     Tobacco Use    Smoking status: Never     Passive exposure: Past    Smokeless tobacco: Never          ROS  Review of systems reviewed and pertinent positives mentioned in HPI.      PHYSICAL EXAM  There is not  pain with a resisted situp.    There is not abdominal distention or tenderness    The patient's range of motion reveals that they have 90° of hip flexion on the affected side.   ER 50 degrees.   IR 40 degrees  Hip extension to 10°   Abduction to 45°      The patient is 5 out of 5 strength with resisted hip AB, adduction, hamstring and quadriceps testing.    No pain over the hip flexor, ASIS.  No pain over the proximal hamstring, piriformis      Pain Provacation testing:    Positive impingement sign,with a painful arc from 12 to 3:00.  Negative Psoas impingement/Allen test  Negative instability, Log roll.  Positive subspine impingement test, which is pain with straight hip flexion.    Negative straight leg raise.  Negative circumduction  camila.      Peritrochanteric space examination:  Tenderness over the randall-trochanteric space - No  pain over the posterior trochanter - No      IMAGING  X-rays reviewed reveal no gross fracture or dislocation. The L hip demonstrates moderate L hip osteoarthritis as well as L hip dysplasia. L hip shows Tonnis angle of 16.5 and LCEA of 17.8.     No MRI to review.      ASSESSMENT/PLAN  This is a 44 y.o. female patient here today with significant hip pain secondary to Left acetabular dysplasia, osteoarthritis    At this time I recommend the pt continues PT. I will also refer her to Dr. Womack if she desires an L hip steroid injection to manage her L hip pain at this time. I discussed that she is not a candidate for arthroscopic surgery given her age, and that she would be better suited for a total hip arthroplasty in the future. I will refer her to Dr. Diez and Dr. Johnson to discuss DAKOTAH moving forward.

## 2024-09-30 ENCOUNTER — TREATMENT (OUTPATIENT)
Dept: PHYSICAL THERAPY | Facility: CLINIC | Age: 44
End: 2024-09-30
Payer: COMMERCIAL

## 2024-09-30 DIAGNOSIS — M25.552 LEFT HIP PAIN: ICD-10-CM

## 2024-09-30 PROCEDURE — 97110 THERAPEUTIC EXERCISES: CPT | Mod: GP | Performed by: PHYSICAL THERAPIST

## 2024-09-30 NOTE — PROGRESS NOTES
"  Physical Therapy Treatment    Patient Name: Paty Tom  MRN: [id number]  Today's Date: 9/30/2024  Visit 8/mn  DOS/DOI:  1/1/2024 (date symptoms worsened)  Referred by:   Tiffani Smith  Time Calculation  Start Time: 0835  Stop Time: 0932  Time Calculation (min): 57 min     PT Therapeutic Procedures Time Entry  Therapeutic Exercise Time Entry: 57                 Diagnosis:   1. Left hip pain  Follow Up In Physical Therapy            PRECAUTIONS:  none    SUBJECTIVE:  She saw Dr. Walker who said she is not a surgical candidate in regard to the labrum due to the presence of OA.  she will need to do exercise to maintain stability until she is ready for a DAKOTAH.  Today she reports 3/10 pain at the L groin.  Hip flexion increases pain.  She continues to feel shifting but doesn't have pain when it shifts since starting PT.    HEP compliance: yes    OBJECTIVE:  - L hip AROM flex = 130, abd = 30  - MMT L hip abd = 4+/5, glut min = 4+/5 and glut max = 4+/5    Treatment:  - continue with core stab and hip strengthening exercise to stabilize the hip.  Therapeutic Exercise  Therapeutic Exercise Activity 1: NuStep L4, 5 min  Therapeutic Exercise Activity 2: SLS on bosu (flat side down), med ball toss, red ball, B, x30  Therapeutic Exercise Activity 3: hip add, ext, abd on multi hip machine, L4,B, 2x20  Therapeutic Exercise Activity 4: B SLS on disc with sliding toe along \"Y\" x10  Therapeutic Exercise Activity 5: reverse plank, 5 sec hold x10  Therapeutic Exercise Activity 6: forward plank on hands, 10 sec x 10  Therapeutic Exercise Activity 7: SLS on palmer disc with 4-way kick, PPL TB, 2x10                ASSESSMENT:  Orquidea rx well.  She continues to have symptoms due to a labral injury related to WENCESLAO.  This causes pain at the L groin that worsens with flexion.  She also has shifting in the joint but no longer has pain when it shifts since starting therapy.  As she is not a surgical candidate, she will need to continue with strength " and stability exercises to support the joint.  She may have a DAKOTAH in the future.  As she is a dancer and familiar with exercise, she will be able to do this independently for long term management of symptoms.      PLAN:  Discharge to [facility] at next visit.

## 2024-10-02 ENCOUNTER — TREATMENT (OUTPATIENT)
Dept: PHYSICAL THERAPY | Facility: CLINIC | Age: 44
End: 2024-10-02
Payer: COMMERCIAL

## 2024-10-02 DIAGNOSIS — M25.552 LEFT HIP PAIN: ICD-10-CM

## 2024-10-02 PROCEDURE — 97110 THERAPEUTIC EXERCISES: CPT | Mod: GP | Performed by: PHYSICAL THERAPIST

## 2024-10-02 NOTE — PROGRESS NOTES
Physical Therapy Treatment/Discharge    Patient Name: Paty Tom  MRN: [id number]  Today's Date: 10/2/2024  Visit 9/mn  DOS/DOI:  1/1/2024 (date symptoms worsened)  Referred by:   Tiffani Smith  Time Calculation  Start Time: 1008  Stop Time: 1053  Time Calculation (min): 45 min     PT Therapeutic Procedures Time Entry  Therapeutic Exercise Time Entry: 45                 Diagnosis:   1. Left hip pain  Follow Up In Physical Therapy            PRECAUTIONS:  none    SUBJECTIVE:  She was on her feet a lot yesterday and exercises.  She had to take tylenol afterward due to pain.  Today she reports 4/10 pain at the L groin area.  HEP compliance: yes    OBJECTIVE:  - L hip AROM flex = 127, abd = 35, ER = 40, IR = 45  - L MMT flex = 5/5 with pain, ext = 5/5, add = 5/5, abd = 4+/5, ER/IR = 4+/5.  L glut min/med = 5/5  - Positive ANITA JETT scour  - LEFS = 58 (70 at eval)    Treatment:  - continued with exercise for L hip strength and stability.  Therapeutic Exercise  Therapeutic Exercise Activity 1: NuStep L4, 5 min  Therapeutic Exercise Activity 2: SLS on bosu (flat side down), med ball toss, red ball, B, x30  Therapeutic Exercise Activity 3: hip add, ext, abd on multi hip machine, L4,B, 2x20  Therapeutic Exercise Activity 5: reverse plank, 5 sec hold x10  Therapeutic Exercise Activity 6: forward plank on hands, 10 sec x 10  Therapeutic Exercise Activity 7: SLS on palmer disc with 4-way kick, PPL TB, 2x10              - she was given a Hep to continue independently as seen below:    Access Code: [email or web address]  URL: [email or web address]  Date: 10/02/2024  Prepared by: Malorie Arevalo    Exercises  - Standing Repeated Hip Abduction with Resistance  - 1 x daily - 4 x weekly - 3 sets - 10 reps  - Standing Repeated Hip Flexion with Resistance  - 1 x daily - 4 x weekly - 3 sets - 10 reps  - Standing Repeated Hip Extension with Resistance  - 1 x daily - 4 x weekly - 3 sets - 10 reps  - Standing Repeated Hip  Adduction with Resistance  - 1 x daily - 4 x weekly - 3 sets - 10 reps  - Clamshell with Resistance  - 1 x daily - 4 x weekly - 3 sets - 10 reps  - Forward T  - 1 x daily - 4 x weekly - 3 sets - 10 reps  - Supine Bridge with Resistance Band  - 1 x daily - 4 x weekly - 3 sets - 10 reps  - Squat on BOSU® Ball  - 1 x daily - 4 x weekly - 3 sets - 10 reps  - Lateral Step Up and Overs on BOSU  - 1 x daily - 4 x weekly - 3 sets - 10 reps  - Standard Plank  - 1 x daily - 4 x weekly - 2-3 sets - 10 reps - 10 hold  - Supine Plank  - 1 x daily - 4 x weekly - 2-3 sets - 10 reps - 10 hold    ASSESSMENT:  Good felisha of exercise.  She continues to have pain and instability due to the presence of WENCESLAO and OA.  She has progressed well in therapy but will need to continue managing her symptoms long term with exercise as she is not currently a surgical candidate.  If she does require surgery in the future it will be a DAKOTAH vs labral repair.  As she would like to delay this as long as possible the home exercises are even more important.  She is able to continue independently with a HEP at this time.    Resolved       PT Problem       she will be independent with he HEP (Met)       Start:  09/03/24    Expected End:  09/10/24    Resolved:  10/02/24         increase AROM ER/IR of L hip for 5-10 degrees for positions requiring rot of the hip such as crossing her ankle over there R leg to put shoes on.       Start:  09/03/24    Expected End:  09/17/24            increase stability of the L hip by achieving 5/5 strength of the glut med and max       Start:  09/03/24    Expected End:  10/15/24            return to activities such as running and hiking without symptoms increasing.       Start:  09/03/24    Expected End:  10/15/24            demonstrate labral healing by having a negative LEONORIR, ANITA and aly test       Start:  09/03/24    Expected End:  10/15/24              PLAN:  Discharge to [facility]

## 2024-10-17 ENCOUNTER — HOSPITAL ENCOUNTER (OUTPATIENT)
Dept: RADIOLOGY | Facility: EXTERNAL LOCATION | Age: 44
Discharge: HOME | End: 2024-10-17

## 2024-10-17 ENCOUNTER — OFFICE VISIT (OUTPATIENT)
Dept: ORTHOPEDIC SURGERY | Facility: HOSPITAL | Age: 44
End: 2024-10-17
Payer: COMMERCIAL

## 2024-10-17 DIAGNOSIS — M16.32 OSTEOARTHRITIS RESULTING FROM LEFT HIP DYSPLASIA: Primary | ICD-10-CM

## 2024-10-17 DIAGNOSIS — M25.552 LEFT HIP PAIN: ICD-10-CM

## 2024-10-17 PROCEDURE — 20611 DRAIN/INJ JOINT/BURSA W/US: CPT | Mod: LT | Performed by: FAMILY MEDICINE

## 2024-10-17 PROCEDURE — 2500000004 HC RX 250 GENERAL PHARMACY W/ HCPCS (ALT 636 FOR OP/ED): Performed by: FAMILY MEDICINE

## 2024-10-17 PROCEDURE — 99214 OFFICE O/P EST MOD 30 MIN: CPT | Performed by: FAMILY MEDICINE

## 2024-10-17 RX ORDER — LIDOCAINE HYDROCHLORIDE 10 MG/ML
4 INJECTION, SOLUTION INFILTRATION; PERINEURAL
Status: COMPLETED | OUTPATIENT
Start: 2024-10-17 | End: 2024-10-17

## 2024-10-17 RX ORDER — TRIAMCINOLONE ACETONIDE 40 MG/ML
80 INJECTION, SUSPENSION INTRA-ARTICULAR; INTRAMUSCULAR
Status: COMPLETED | OUTPATIENT
Start: 2024-10-17 | End: 2024-10-17

## 2024-10-17 ASSESSMENT — PAIN - FUNCTIONAL ASSESSMENT: PAIN_FUNCTIONAL_ASSESSMENT: 0-10

## 2024-10-17 ASSESSMENT — PAIN SCALES - GENERAL: PAINLEVEL_OUTOF10: 2

## 2024-10-17 NOTE — PROGRESS NOTES
** Please excuse any errors in grammar or translation related to this dictation. Voice recognition software was utilized to prepare this document. **    Assessment & Plan:  Dx: Left hip osteoarthritis and left hip dysplasia    Discussed with patient that current presentation is most aligned with the above diagnosis along with its mechanism, management, and anticipated outcome. Discussed options for management of this condition and made shared decision making for the selected treatment listed below.      We discussed diagnosis and treatment options including NSAIDs, Tylenol, Physical therapy, CSI, PRP injection, visco injection.  Discussed benefit, reviewed the risk, and alternatives, patient elected to proceed with ultrasound-guided left hip intra-articular corticosteroid injection.  Patient tolerated injection well.      Today's treatment plan:   - Performed left hip in ultrasound-guided intra-articular corticosteroid injection on 10/17/2024.  - Continue current pain regimen and home exercise  - Work/exercise limits: No specific restrictions. All activities as tolerated.     All questions answered and patient is agreeable to plan of care.    Follow-up as needed.    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 Walker for chronic left hip pain.  She has had left hip pain for couple years that has gradually worsened over the past 6 months. Pain described as deep within the left hip that is worse with walking, deep hip flexion and rising from a deep squatting position.  She completed physical therapy this past month.  She continues to do home exercises.  She uses ibuprofen 400 mg 1-2 times every week as needed.  Denies interval trauma, fall, injury of her left hip.      Patient Active Problem List   Diagnosis    Anxiety and depression    Palpitations    Left hip pain     No past surgical history on file.  Current Outpatient  Medications on File Prior to Visit   Medication Sig Dispense Refill    adrenal cortex, porcine, (ADRENAL ORAL) Take by mouth.      cholecalciferol (Vitamin D-3) 25 MCG (1000 UT) capsule Take by mouth.      docosahexaenoic acid/epa (FISH OIL ORAL) Take by mouth.      escitalopram (Lexapro) 20 mg tablet Take 1 tablet (20 mg) by mouth once daily. 90 tablet 3    multivitamin tablet Take 1 tablet by mouth once daily.      turmeric-turmeric root extract 450-50 mg capsule Take 1 capsule by mouth once daily.       No current facility-administered medications on file prior to visit.       Exam:  Left Hip Exam:  Normal gait  No warmth, erythema or ecchymosis overlying.  Active flexion >90 degrees with normal abduction, adduction, IR and ER.  NTTP over greater trochanter, glute tendons, proximal ITB  [5]/5 strength of hip flexion, abduction, & adduction  SILT  [ - ]Log roll pain, [ + ]FADIR, [ - ]ANITA, [ + ]Stinchfield,  [ + ]Scour  [ - ]Pain with resisted hip flexion with knee flexed, [ - ]Charlotte´s    General Exam:  Constitutional - NAD, AAO x 3, conversing appropriately.  HEENT- Normocephalic and atraumatic. No facial deformities. Hearing grossly normal.  Lungs - Breathing non-labored with normal rate. No accessory muscle use.  CV - Extremities warm and well-perfused, brisk capillary refill present.   Neuro - CN II-XII grossly intact.    Results:  Xrays of left hip obtained on 9/25/2024 personally interpreted as moderate osteoarthritis with hip dysplasia.    Reviewed referral note.    Lab Results   Component Value Date    CREATININE 0.71 02/26/2024    EGFR >90 02/26/2024       Procedure:  Patient ID: Paty Tom is a 44 y.o. female.    L Inj/Asp: L hip joint on 10/17/2024 9:45 AM  Indications: pain  Details: 22 G needle, ultrasound-guided anterior approach  Medications: 4 mL lidocaine 10 mg/mL (1 %); 80 mg triamcinolone acetonide 40 mg/mL  Outcome: tolerated well, no immediate complications    Procedure risk factors to  include increased pain, bleeding, infection, neurovascular injury, soft tissue injury, transient elevation of blood glucose and blood pressure, and adverse reaction to medication were discussed with the patient. Patient understands there is a moderate risk of morbidity from undergoing the procedure.   Procedure, treatment alternatives, risks and benefits explained, specific risks discussed. Consent was given by the patient. Immediately prior to procedure a time out was called to verify the correct patient, procedure, equipment, support staff and site/side marked as required. Patient was prepped and draped in the usual sterile fashion.

## 2024-12-07 ENCOUNTER — OFFICE VISIT (OUTPATIENT)
Dept: URGENT CARE | Age: 44
End: 2024-12-07
Payer: COMMERCIAL

## 2024-12-07 VITALS
TEMPERATURE: 98 F | OXYGEN SATURATION: 97 % | DIASTOLIC BLOOD PRESSURE: 60 MMHG | SYSTOLIC BLOOD PRESSURE: 92 MMHG | HEART RATE: 58 BPM

## 2024-12-07 DIAGNOSIS — R35.0 FREQUENT URINATION: Primary | ICD-10-CM

## 2024-12-07 LAB
POC APPEARANCE, URINE: ABNORMAL
POC BILIRUBIN, URINE: NEGATIVE
POC BLOOD, URINE: NEGATIVE
POC COLOR, URINE: YELLOW
POC GLUCOSE, URINE: NEGATIVE MG/DL
POC KETONES, URINE: NEGATIVE MG/DL
POC LEUKOCYTES, URINE: ABNORMAL
POC NITRITE,URINE: NEGATIVE
POC PH, URINE: 6 PH
POC PROTEIN, URINE: NEGATIVE MG/DL
POC SPECIFIC GRAVITY, URINE: 1.02

## 2024-12-07 PROCEDURE — 87086 URINE CULTURE/COLONY COUNT: CPT

## 2024-12-07 ASSESSMENT — ENCOUNTER SYMPTOMS
APPETITE CHANGE: 1
FREQUENCY: 0
NAUSEA: 1
RECTAL PAIN: 0
ABDOMINAL PAIN: 1
CONSTIPATION: 0
DIARRHEA: 0
DYSURIA: 0
VOMITING: 0
RESPIRATORY NEGATIVE: 1
CHILLS: 1
FEVER: 0
FLANK PAIN: 0

## 2024-12-07 NOTE — PROGRESS NOTES
Offered that she had abdominal pain she says she has not so has abdominal pain abdominal pain.  Will send hide UTI.  She is 1 1 right so she has on the fluids.  I think we were hide this urinary yeah yeah yeah what I can probably yeah okay Subjective   Patient ID: Paty Tom is a 44 y.o. female. They present today with a chief complaint of frequent urination (Lower abd pain, burning, pressure, swelling xyesterday).    History of Present Illness  Lower abdominal pain since yesterday.  Symptoms have worsened since yesterday.  She is now having some anorexia nausea and feels shaky.  Her  tells me that he found her curled up in a ball this afternoon when he came home.  She tells me that before she took pain medication at home she was unable to walk upright.      History provided by:  Patient and spouse   used: No        Past Medical History  Allergies as of 12/07/2024 - Reviewed 12/07/2024   Allergen Reaction Noted    Codeine Nausea Only and Nausea/vomiting 08/24/2015       (Not in a hospital admission)       Past Medical History:   Diagnosis Date    Personal history of other diseases of the nervous system and sense organs 11/25/2015    History of otitis media    Personal history of other diseases of the respiratory system 01/20/2016    History of acute pharyngitis    Pharyngitis due to Streptococcus species 08/07/2024       History reviewed. No pertinent surgical history.     reports that she has never smoked. She has been exposed to tobacco smoke. She has never used smokeless tobacco.    Review of Systems  Review of Systems   Constitutional:  Positive for appetite change and chills. Negative for fever.   HENT: Negative.     Respiratory: Negative.     Gastrointestinal:  Positive for abdominal pain and nausea. Negative for constipation, diarrhea, rectal pain and vomiting.        Patient points to the midline lower abdomen suprapubic area as the painful area.   Genitourinary:  Positive  for pelvic pain. Negative for decreased urine volume, dysuria, flank pain, frequency and urgency.                                  Objective    Vitals:    12/07/24 1651   BP: 92/60   BP Location: Left arm   Patient Position: Sitting   BP Cuff Size: Adult   Pulse: 58   Temp: 36.7 °C (98 °F)   TempSrc: Oral   SpO2: 97%     No LMP recorded.    Physical Exam  Vitals and nursing note reviewed.   Constitutional:       Appearance: Normal appearance. She is normal weight.      Comments: Pleasant cooperative 44-year-old female slightly uncomfortable appearing here with significant other   HENT:      Head: Normocephalic and atraumatic.      Nose: Nose normal.      Mouth/Throat:      Mouth: Mucous membranes are moist.   Eyes:      Extraocular Movements: Extraocular movements intact.      Pupils: Pupils are equal, round, and reactive to light.   Cardiovascular:      Rate and Rhythm: Normal rate and regular rhythm.   Pulmonary:      Effort: Pulmonary effort is normal. No respiratory distress.   Abdominal:      General: There is no distension.      Palpations: Abdomen is soft. There is no mass.      Tenderness: There is abdominal tenderness. There is guarding. There is no right CVA tenderness, left CVA tenderness or rebound.      Hernia: No hernia is present.      Comments: Abdomen is soft flat.  She has tenderness across the lower abdomen maximal tenderness in the midline in the suprapubic area.  Mild guarding bilateral lower quadrants immediately.  No masses.  She is currently able to walk upright after having taken some ibuprofen for her pain at home   Musculoskeletal:         General: Normal range of motion.   Skin:     General: Skin is warm and dry.   Neurological:      General: No focal deficit present.      Mental Status: She is alert and oriented to person, place, and time.   Psychiatric:         Mood and Affect: Mood normal.         Behavior: Behavior normal.         Procedures    Point of Care Test & Imaging Results  from this visit  Results for orders placed or performed in visit on 12/07/24   POCT UA Automated manually resulted   Result Value Ref Range    POC Color, Urine Yellow Straw, Yellow, Light-Yellow    POC Appearance, Urine Cloudy (A) Clear    POC Glucose, Urine NEGATIVE NEGATIVE mg/dl    POC Bilirubin, Urine NEGATIVE NEGATIVE    POC Ketones, Urine NEGATIVE NEGATIVE mg/dl    POC Specific Gravity, Urine 1.025 1.005 - 1.035    POC Blood, Urine NEGATIVE NEGATIVE    POC PH, Urine 6.0 No Reference Range Established PH    POC Protein, Urine NEGATIVE NEGATIVE, 30 (1+) mg/dl    Poc Nitrite, Urine NEGATIVE NEGATIVE    POC Leukocytes, Urine TRACE (A) NEGATIVE      No results found.    Diagnostic study results (if any) were reviewed by Annamaria Stein PA-C.    Assessment/Plan   Allergies, medications, history, and pertinent labs/EKGs/Imaging reviewed by Annamaria Stein PA-C.     Medical Decision Making  History and physical examination are consistent with acute abdominal pain.  Urinalysis is inconsistent with urinary tract infection.  Recommend she go to the emergency department for more complete evaluation at a higher level of care.  She was reminded to be n.p.o. on the way to the ER.  Her  will take her.    Orders and Diagnoses  Diagnoses and all orders for this visit:  Frequent urination  -     POCT UA Automated manually resulted  -     Urine Culture      Medical Admin Record      Patient disposition: ED    Electronically signed by Annamaria Stein PA-C  5:59 PM

## 2024-12-09 LAB — BACTERIA UR CULT: NORMAL

## 2025-01-03 ENCOUNTER — OFFICE VISIT (OUTPATIENT)
Dept: URGENT CARE | Age: 45
End: 2025-01-03
Payer: COMMERCIAL

## 2025-01-03 VITALS
TEMPERATURE: 98.7 F | SYSTOLIC BLOOD PRESSURE: 113 MMHG | HEART RATE: 75 BPM | DIASTOLIC BLOOD PRESSURE: 79 MMHG | OXYGEN SATURATION: 97 %

## 2025-01-03 DIAGNOSIS — Z20.822 COVID-19 VIRUS RNA NOT DETECTED: ICD-10-CM

## 2025-01-03 DIAGNOSIS — R52 BODY ACHES: ICD-10-CM

## 2025-01-03 DIAGNOSIS — B34.9 VIRAL ILLNESS: Primary | ICD-10-CM

## 2025-01-03 LAB
POC BINAX EXPIRATION: NEGATIVE
POC BINAX NOW COVID SERIAL NUMBER: NEGATIVE
POC RAPID INFLUENZA A: NEGATIVE
POC RAPID INFLUENZA B: NEGATIVE
POC SARS-COV-2 AG BINAX: NORMAL

## 2025-01-03 NOTE — PROGRESS NOTES
Subjective   Patient ID: Paty Tom is a 44 y.o. female. They present today with a chief complaint of Generalized Body Aches (Pt states x4days body ache, productive cough and swelling of the throat. Phlegm is yellow ).    History of Present Illness  45 yo female coming in for body aches, cough, sore throat, and yellow phlegm. She states this has been going on for the last 4 days. She denies any fevers or chills. She denies any shortness of breath.     Past Medical History  Allergies as of 01/03/2025 - Reviewed 01/03/2025   Allergen Reaction Noted    Codeine Nausea Only and Nausea/vomiting 08/24/2015       (Not in a hospital admission)       Past Medical History:   Diagnosis Date    Personal history of other diseases of the nervous system and sense organs 11/25/2015    History of otitis media    Personal history of other diseases of the respiratory system 01/20/2016    History of acute pharyngitis    Pharyngitis due to Streptococcus species 08/07/2024       No past surgical history on file.     reports that she has never smoked. She has been exposed to tobacco smoke. She has never used smokeless tobacco.    Review of Systems  Review of Systems:  General: No weight loss, fatigue, anorexia, insomnia, fever, chills.  ENT: Positive pharyngitis, no dry mouth, nasal congestion, ear pain  Cardiac: No chest pain, palpitations, syncope, near syncope.  Pulmonary:  No shortness of breath, positive cough, no hemoptysis  Heme/lymph: No swollen glands, fever, bleeding  Musculoskeletal: No limb pain, joint pain, joint swelling. Positive body aches  Skin: No rashes  Neuro: No numbness, tingling, headaches                                 Objective    Vitals:    01/03/25 1047   BP: 113/79   Pulse: 75   Temp: 37.1 °C (98.7 °F)   SpO2: 97%     No LMP recorded.    Physical Exam  Physical Exam:  General: Vital noted, no distress. Afebrile  EENT: Eyes unremarkable, Pupils PERRLA, EOMs intact. TMs unremarkable. Posterior oropharynx  unremarkable. Uvula in the midline and non-edematous. No PTA. No retropharyngeal mass. No Herbert's angina.  Cardiac: Regular rate and rhythm, no murmur  Pulmonary: Lungs clear bilaterally with good aeration. No adventitious breath sounds.  Skin: No rashes    Procedures    Point of Care Test & Imaging Results from this visit  Results for orders placed or performed in visit on 01/03/25   POCT Covid-19 Rapid Antigen   Result Value Ref Range    Binax NOW Covid Serial Number negative     BINAX NOW Covid Expiration negative     POC JORGE-COV-2 AG  Presumptive negative test for SARS-CoV-2 (no antigen detected)     Presumptive negative test for SARS-CoV-2 (no antigen detected)   POCT Influenza A/B manually resulted   Result Value Ref Range    POC Rapid Influenza A Negative Negative    POC Rapid Influenza B Negative Negative      No results found.    Diagnostic study results (if any) were reviewed by SISI Ly.    Assessment/Plan   Allergies, medications, history, and pertinent labs/EKGs/Imaging reviewed by SISI Ly.     Medical Decision Making  Testing: rapid covid and flu  Treatment: Advised OTC for symptom relief  Differential: 1) covid, 2) flu, 3) pneumonia  Plan: Patient will follow up with the PCP in the next 2-3 days. Return for any worsening symptoms or go to the ER for further evaluation. Patient understands return precautions and discharge insturctions.  Impression:   1) viral illness      Orders and Diagnoses  Diagnoses and all orders for this visit:  Viral illness  Body aches  -     POCT Covid-19 Rapid Antigen  -     POCT Influenza A/B manually resulted  COVID-19 virus RNA not detected      Medical Admin Record      Patient disposition: Home    Electronically signed by SISI Ly  11:11 AM

## 2025-02-27 ENCOUNTER — APPOINTMENT (OUTPATIENT)
Dept: PRIMARY CARE | Facility: CLINIC | Age: 45
End: 2025-02-27

## 2025-04-08 ENCOUNTER — APPOINTMENT (OUTPATIENT)
Dept: PRIMARY CARE | Facility: CLINIC | Age: 45
End: 2025-04-08
Payer: COMMERCIAL

## 2025-04-08 VITALS
WEIGHT: 146 LBS | OXYGEN SATURATION: 99 % | HEART RATE: 76 BPM | BODY MASS INDEX: 21.62 KG/M2 | TEMPERATURE: 97.2 F | HEIGHT: 69 IN | DIASTOLIC BLOOD PRESSURE: 60 MMHG | SYSTOLIC BLOOD PRESSURE: 100 MMHG

## 2025-04-08 DIAGNOSIS — Z00.00 HEALTHCARE MAINTENANCE: Primary | ICD-10-CM

## 2025-04-08 DIAGNOSIS — Z13.6 SCREENING FOR ISCHEMIC HEART DISEASE: ICD-10-CM

## 2025-04-08 DIAGNOSIS — Z13.29 SCREENING FOR THYROID DISORDER: ICD-10-CM

## 2025-04-08 DIAGNOSIS — Z13.0 SCREENING FOR IRON DEFICIENCY ANEMIA: ICD-10-CM

## 2025-04-08 DIAGNOSIS — F41.9 ANXIETY: ICD-10-CM

## 2025-04-08 PROCEDURE — 99396 PREV VISIT EST AGE 40-64: CPT | Performed by: NURSE PRACTITIONER

## 2025-04-08 PROCEDURE — 1036F TOBACCO NON-USER: CPT | Performed by: NURSE PRACTITIONER

## 2025-04-08 PROCEDURE — 3008F BODY MASS INDEX DOCD: CPT | Performed by: NURSE PRACTITIONER

## 2025-04-08 RX ORDER — ESCITALOPRAM OXALATE 20 MG/1
20 TABLET ORAL DAILY
Qty: 90 TABLET | Refills: 3 | Status: SHIPPED | OUTPATIENT
Start: 2025-04-08

## 2025-04-08 ASSESSMENT — ENCOUNTER SYMPTOMS
GASTROINTESTINAL NEGATIVE: 1
HEMATOLOGIC/LYMPHATIC NEGATIVE: 1
NEUROLOGICAL NEGATIVE: 1
MUSCULOSKELETAL NEGATIVE: 1
CARDIOVASCULAR NEGATIVE: 1
RESPIRATORY NEGATIVE: 1
EYES NEGATIVE: 1
CONSTITUTIONAL NEGATIVE: 1
ENDOCRINE NEGATIVE: 1
PSYCHIATRIC NEGATIVE: 1

## 2025-04-08 NOTE — PROGRESS NOTES
"Subjective   Patient ID: Paty Tom is a 44 y.o. female who presents for Annual Exam.    HPI Presents today for health maintenance exam  Overall health is very good.   Dental exams every 3 month.  Receding gums.  Brushes 3 times a day and floss daily.   Eye exams wears glasses.   Skin checks yearly.   Non smoker, alcohol 0-1 per week, no drug use.   Diet  well balanced, caffeine tea every other day.   Exercise 4 times a week for cardio and weight lifting.  Outdoor biking and swimming.   Pap 2023 negative and HPV negative. Ruptured ovarian cyst as noted 12/7/2024 .  See gyn for follow up next month  Mammogram 9/2024 dense breast, gyn said can do MRI will discuss with her further  Immunizations as noted.  Has all her childhood immunizations  Had a steroid injection and PT on left hip.  It is much better  Doing well on escitalopram, anxiety is good control  Review of Systems   Constitutional: Negative.    HENT: Negative.     Eyes: Negative.    Respiratory: Negative.     Cardiovascular: Negative.    Gastrointestinal: Negative.    Endocrine: Negative.    Genitourinary: Negative.    Musculoskeletal: Negative.    Skin: Negative.    Neurological: Negative.    Hematological: Negative.    Psychiatric/Behavioral: Negative.         Objective   /60 (BP Location: Left arm, Patient Position: Sitting)   Pulse 76   Temp 36.2 °C (97.2 °F) (Temporal)   Ht 1.74 m (5' 8.5\")   Wt 66.2 kg (146 lb)   SpO2 99%   BMI 21.88 kg/m²     Physical Exam  Constitutional:       Appearance: Normal appearance.   HENT:      Right Ear: Tympanic membrane, ear canal and external ear normal.      Left Ear: Tympanic membrane, ear canal and external ear normal.      Nose: Nose normal.      Mouth/Throat:      Mouth: Mucous membranes are moist.      Pharynx: Oropharynx is clear.   Eyes:      Pupils: Pupils are equal, round, and reactive to light.   Neck:      Thyroid: No thyroid mass, thyromegaly or thyroid tenderness.   Cardiovascular:      " Rate and Rhythm: Normal rate and regular rhythm.      Pulses: Normal pulses.      Heart sounds: Normal heart sounds.   Pulmonary:      Effort: Pulmonary effort is normal.      Breath sounds: Normal breath sounds.   Abdominal:      General: Abdomen is flat. Bowel sounds are normal.      Palpations: Abdomen is soft.   Musculoskeletal:         General: Normal range of motion.      Cervical back: Normal range of motion.   Lymphadenopathy:      Cervical: No cervical adenopathy.   Skin:     General: Skin is warm.   Neurological:      General: No focal deficit present.      Mental Status: She is alert and oriented to person, place, and time.   Psychiatric:         Mood and Affect: Mood normal.         Behavior: Behavior normal.         Assessment/Plan   Problem List Items Addressed This Visit    None  Visit Diagnoses         Codes    Healthcare maintenance    -  Primary Z00.00    Relevant Orders    CBC and Auto Differential    Comprehensive Metabolic Panel    TSH with reflex to Free T4 if abnormal    Lipid Panel    Anxiety     F41.9    Relevant Medications    escitalopram (Lexapro) 20 mg tablet    Screening for iron deficiency anemia     Z13.0    Relevant Orders    CBC and Auto Differential    Screening for thyroid disorder     Z13.29    Relevant Orders    TSH with reflex to Free T4 if abnormal    Screening for ischemic heart disease     Z13.6    Relevant Orders    Comprehensive Metabolic Panel    Lipid Panel

## 2025-04-08 NOTE — PATIENT INSTRUCTIONS
I will follow up with the blood work.   Medications renewed.   Follow up in 1 year with Dr Smallwood due to my group home.   It has been a pleasure being part of your health care team

## 2025-04-24 LAB
ALBUMIN SERPL-MCNC: 4 G/DL (ref 3.6–5.1)
ALP SERPL-CCNC: 41 U/L (ref 31–125)
ALT SERPL-CCNC: 5 U/L (ref 6–29)
ANION GAP SERPL CALCULATED.4IONS-SCNC: 7 MMOL/L (CALC) (ref 7–17)
AST SERPL-CCNC: 12 U/L (ref 10–35)
BASOPHILS # BLD AUTO: 49 CELLS/UL (ref 0–200)
BASOPHILS NFR BLD AUTO: 0.6 %
BILIRUB SERPL-MCNC: 0.4 MG/DL (ref 0.2–1.2)
BUN SERPL-MCNC: 8 MG/DL (ref 7–25)
CALCIUM SERPL-MCNC: 9 MG/DL (ref 8.6–10.2)
CHLORIDE SERPL-SCNC: 102 MMOL/L (ref 98–110)
CHOLEST SERPL-MCNC: 170 MG/DL
CHOLEST/HDLC SERPL: 2.9 (CALC)
CO2 SERPL-SCNC: 30 MMOL/L (ref 20–32)
CREAT SERPL-MCNC: 0.8 MG/DL (ref 0.5–0.99)
EGFRCR SERPLBLD CKD-EPI 2021: 92 ML/MIN/1.73M2
EOSINOPHIL # BLD AUTO: 279 CELLS/UL (ref 15–500)
EOSINOPHIL NFR BLD AUTO: 3.4 %
ERYTHROCYTE [DISTWIDTH] IN BLOOD BY AUTOMATED COUNT: 11.7 % (ref 11–15)
GLUCOSE SERPL-MCNC: 76 MG/DL (ref 65–99)
HCT VFR BLD AUTO: 37.9 % (ref 35–45)
HDLC SERPL-MCNC: 59 MG/DL
HGB BLD-MCNC: 12.8 G/DL (ref 11.7–15.5)
LDLC SERPL CALC-MCNC: 87 MG/DL (CALC)
LYMPHOCYTES # BLD AUTO: 1410 CELLS/UL (ref 850–3900)
LYMPHOCYTES NFR BLD AUTO: 17.2 %
MCH RBC QN AUTO: 33.6 PG (ref 27–33)
MCHC RBC AUTO-ENTMCNC: 33.8 G/DL (ref 32–36)
MCV RBC AUTO: 99.5 FL (ref 80–100)
MONOCYTES # BLD AUTO: 672 CELLS/UL (ref 200–950)
MONOCYTES NFR BLD AUTO: 8.2 %
NEUTROPHILS # BLD AUTO: 5789 CELLS/UL (ref 1500–7800)
NEUTROPHILS NFR BLD AUTO: 70.6 %
NONHDLC SERPL-MCNC: 111 MG/DL (CALC)
PLATELET # BLD AUTO: 214 THOUSAND/UL (ref 140–400)
PMV BLD REES-ECKER: 10.1 FL (ref 7.5–12.5)
POTASSIUM SERPL-SCNC: 3.8 MMOL/L (ref 3.5–5.3)
PROT SERPL-MCNC: 6.6 G/DL (ref 6.1–8.1)
RBC # BLD AUTO: 3.81 MILLION/UL (ref 3.8–5.1)
SODIUM SERPL-SCNC: 139 MMOL/L (ref 135–146)
TRIGL SERPL-MCNC: 140 MG/DL
TSH SERPL-ACNC: 2.06 MIU/L
WBC # BLD AUTO: 8.2 THOUSAND/UL (ref 3.8–10.8)

## 2026-04-08 ENCOUNTER — APPOINTMENT (OUTPATIENT)
Dept: PRIMARY CARE | Facility: CLINIC | Age: 46
End: 2026-04-08
Payer: COMMERCIAL